# Patient Record
Sex: FEMALE | Race: OTHER | NOT HISPANIC OR LATINO | URBAN - METROPOLITAN AREA
[De-identification: names, ages, dates, MRNs, and addresses within clinical notes are randomized per-mention and may not be internally consistent; named-entity substitution may affect disease eponyms.]

---

## 2024-08-21 ENCOUNTER — HOSPITAL ENCOUNTER (EMERGENCY)
Facility: HOSPITAL | Age: 54
Discharge: LEFT BEFORE DISCHARGE | End: 2024-08-21
Attending: STUDENT IN AN ORGANIZED HEALTH CARE EDUCATION/TRAINING PROGRAM

## 2024-08-21 VITALS
OXYGEN SATURATION: 98 % | WEIGHT: 128.31 LBS | DIASTOLIC BLOOD PRESSURE: 74 MMHG | SYSTOLIC BLOOD PRESSURE: 142 MMHG | RESPIRATION RATE: 18 BRPM | TEMPERATURE: 98 F | HEART RATE: 87 BPM

## 2024-08-21 DIAGNOSIS — N89.8 VAGINAL ITCHING: Primary | ICD-10-CM

## 2024-08-21 PROCEDURE — 99282 EMERGENCY DEPT VISIT SF MDM: CPT

## 2024-08-21 RX ORDER — CLOPIDOGREL BISULFATE 75 MG/1
1 TABLET ORAL DAILY
COMMUNITY
Start: 2024-05-20

## 2024-08-21 RX ORDER — LISINOPRIL 20 MG/1
1 TABLET ORAL EVERY MORNING
COMMUNITY

## 2024-08-21 RX ORDER — ROSUVASTATIN CALCIUM 10 MG/1
1 TABLET, COATED ORAL DAILY
COMMUNITY
Start: 2024-05-20

## 2024-08-21 RX ORDER — METFORMIN HYDROCHLORIDE 1000 MG/1
1000 TABLET ORAL
COMMUNITY

## 2024-08-22 NOTE — ED PROVIDER NOTES
SCRIBE #1 NOTE: I, Sharda Madsen, am scribing for, and in the presence of, Nikolai Cross MD. I have scribed the entire note.       History     Chief Complaint   Patient presents with    Vaginal Itching     Pt c/o vaginal itching x 1 week     Review of patient's allergies indicates:  No Known Allergies      History of Present Illness     HPI    8/21/2024, 11:47 PM  History obtained from the patient      History of Present Illness: Darline Mott is a 54 y.o. female patient who presents to the emergency department due to vaginal itching.  Immediately upon the patient getting into the room, I was informed by nursing staff that she wanted to leave.  I went into the room to speak with her, she states that she is having some vaginal itching, but does not want to talk about it and she is currently in an argument with her daughter, so she just wants to leave.  She states she is in a bad mood and she no longer wants to stay.  She does not answer any further questions.    PCP: No primary care provider on file.        Past Medical History:  History reviewed. No pertinent past medical history.    Past Surgical History:  History reviewed. No pertinent surgical history.      Family History:  No family history on file.    Social History:  Social History     Tobacco Use    Smoking status: Never    Smokeless tobacco: Never   Substance and Sexual Activity    Alcohol use: Not on file    Drug use: Not on file    Sexual activity: Not on file        Review of Systems     Review of Systems   Reason unable to perform ROS: Patient uncooperative, does not want to answer any questions due to argument with daughter.   Genitourinary:         Positive vaginal itching        Physical Exam     Initial Vitals [08/21/24 2309]   BP Pulse Resp Temp SpO2   (!) 142/74 87 18 97.7 °F (36.5 °C) 98 %      MAP       --          Physical Exam  Nursing Notes and Vital Signs Reviewed.  Constitutional: Patient is in no acute distress. Well-developed and  well-nourished.  Head: Atraumatic. Normocephalic.  Eyes: PERRL. EOM intact. Conjunctivae are not pale.  ENT: Mucous membranes are moist.     On exam, patient no acute distress.  She did not let me complete a full exam.     ED Course   Procedures  ED Vital Signs:  Vitals:    08/21/24 2309   BP: (!) 142/74   Pulse: 87   Resp: 18   Temp: 97.7 °F (36.5 °C)   TempSrc: Oral   SpO2: 98%   Weight: 58.2 kg (128 lb 4.9 oz)       Abnormal Lab Results:  Labs Reviewed   VAGINAL SCREEN   URINALYSIS, REFLEX TO URINE CULTURE        Imaging Results:  Imaging Results    None             The Emergency Provider reviewed the vital signs and test results, which are outlined above.     ED Discussion       12:05 AM: Pt is A&O x3, appropriate, and of capacity at this time. Pt voices desire to leave hospital. D/w pt in length that she likely has an easily treatable condition, all it would requires a quick exam and vaginal swab.  However,  pt declines any further evaluation or tx at this time. All risks, including worsening sx, permanent bodily harm and death, were discussed in length. Pt acknowledges all risk at this time and would like to leave the emergency department.  Pt given RTER instructions. All questions and concerns addressed at this time. Pt leaving AMA.          Medical Decision Making              ED Medication(s):  Medications - No data to display    Discharge Medication List as of 8/21/2024 11:50 PM                  Scribe Attestation:   Scribe #1: I performed the above scribed service and the documentation accurately describes the services I performed. I attest to the accuracy of the note.     Attending:   Physician Attestation Statement for Scribe #1: I, Nikolai Cross MD, personally performed the services described in this documentation, as scribed by Sharda Madsen, in my presence, and it is both accurate and complete.           Clinical Impression       ICD-10-CM ICD-9-CM   1. Vaginal itching  N89.8 698.1               Nikolai Cross MD  08/22/24 0006

## 2024-08-22 NOTE — DISCHARGE INSTRUCTIONS
Please follow-up with your PCP. Please call on the next business day to schedule this appointment.    Return to the ER any time you would like to be repeat evaluated.

## 2025-04-14 ENCOUNTER — HOSPITAL ENCOUNTER (EMERGENCY)
Facility: HOSPITAL | Age: 55
Discharge: HOME | End: 2025-04-14
Attending: EMERGENCY MEDICINE
Payer: COMMERCIAL

## 2025-04-14 VITALS
HEIGHT: 61 IN | HEART RATE: 99 BPM | WEIGHT: 124 LBS | RESPIRATION RATE: 16 BRPM | OXYGEN SATURATION: 98 % | TEMPERATURE: 98 F | DIASTOLIC BLOOD PRESSURE: 70 MMHG | BODY MASS INDEX: 23.41 KG/M2 | SYSTOLIC BLOOD PRESSURE: 118 MMHG

## 2025-04-14 DIAGNOSIS — L40.9 PSORIASIS: Primary | ICD-10-CM

## 2025-04-14 PROCEDURE — 25000003 PHARM REV CODE 250

## 2025-04-14 PROCEDURE — 99284 EMERGENCY DEPT VISIT MOD MDM: CPT

## 2025-04-14 RX ORDER — CETIRIZINE HYDROCHLORIDE 10 MG/1
10 TABLET ORAL
Status: COMPLETED | OUTPATIENT
Start: 2025-04-14 | End: 2025-04-14

## 2025-04-14 RX ORDER — HYDROXYZINE HYDROCHLORIDE 25 MG/1
25 TABLET, FILM COATED ORAL EVERY 6 HOURS
Qty: 12 TABLET | Refills: 0 | Status: SHIPPED | OUTPATIENT
Start: 2025-04-14

## 2025-04-14 RX ORDER — TRIAMCINOLONE ACETONIDE 1 MG/G
OINTMENT TOPICAL 2 TIMES DAILY
Qty: 30 G | Refills: 2 | Status: SHIPPED | OUTPATIENT
Start: 2025-04-14

## 2025-04-14 RX ADMIN — CETIRIZINE HYDROCHLORIDE 10 MG: 10 TABLET, FILM COATED ORAL at 11:04

## 2025-04-15 NOTE — ED PROVIDER NOTES
Encounter Date: 4/14/2025       History     Chief Complaint   Patient presents with    Rash     Rash to R ankle. Flaking/itching. Started over 1 year but has gotten worse since onset.      55-year-old female presenting to the emergency department with complaints of respiratory or right ankle x1 year.  Patient states the rash has been progressively getting worse over the last year.  She is not being followed by Dermatology.  Her PCP as provided her with ointments over the last year; patient is unable to identify exactly which ointments she has tried.  Rash is red and scaly.  Patient is complaining of associated pruritus.  No known triggers.  There is no associated pain, drainage, or signs of infection.  No other lesions noted; specifically no lesions on the scalp or in the hair.  Patient denies changes in soaps, lotions, and detergents.  No new allergies or new medications.  Denies fever, chills, joint pain, joint swelling, chest pain, shortness of breath, and all other symptoms at this time.    The history is provided by the patient.     Review of patient's allergies indicates:  No Known Allergies  No past medical history on file.  No past surgical history on file.  No family history on file.  Social History[1]  Review of Systems   Constitutional:  Negative for fever.   HENT:  Negative for sore throat.    Respiratory:  Negative for shortness of breath.    Cardiovascular:  Negative for chest pain.   Gastrointestinal:  Negative for nausea.   Genitourinary:  Negative for dysuria.   Musculoskeletal:  Negative for back pain.   Skin:  Positive for rash.   Neurological:  Negative for weakness.   Hematological:  Does not bruise/bleed easily.   All other systems reviewed and are negative.      Physical Exam     Initial Vitals [04/14/25 2226]   BP Pulse Resp Temp SpO2   117/62 99 16 98.1 °F (36.7 °C) 95 %      MAP       --         Physical Exam    Nursing note reviewed.  Constitutional: She appears well-developed and  well-nourished.  Non-toxic appearance. She does not have a sickly appearance. She does not appear ill. No distress.   HENT:   Head: Normocephalic and atraumatic.   Right Ear: Hearing normal.   Left Ear: Hearing normal.   Nose: Nose normal. Mouth/Throat: Uvula is midline and oropharynx is clear and moist.   Eyes: Conjunctivae, EOM and lids are normal. Pupils are equal, round, and reactive to light.   Neck: Trachea normal. Neck supple.   Normal range of motion.   Full passive range of motion without pain.     Cardiovascular:  Normal rate, regular rhythm, normal heart sounds, intact distal pulses and normal pulses.           Pulmonary/Chest: Effort normal and breath sounds normal.   Abdominal: Abdomen is soft. Bowel sounds are normal. There is no abdominal tenderness. There is no rebound and no guarding.   Musculoskeletal:         General: Normal range of motion.      Cervical back: Normal, full passive range of motion without pain, normal range of motion and neck supple.     Neurological: She is alert and oriented to person, place, and time. She has normal strength and normal reflexes. No cranial nerve deficit or sensory deficit. GCS eye subscore is 4. GCS verbal subscore is 5. GCS motor subscore is 6.   Skin: Skin is warm and dry.   On the right lateral ankle, there is a well-demarcated, erythematous plaque with overlying silvery white scales.  Measuring approximately 10 cm x 10 cm.  No purulence, drainage, warmth, or fluctuance.  No vesicles or ulcerations noted.  Patient does not have any other lesions on the extensor surfaces.  No involvement of nail plates are of the scalp.   Psychiatric: She has a normal mood and affect. Her behavior is normal. Thought content normal.         ED Course   Procedures  Labs Reviewed - No data to display       Imaging Results    None          Medications   cetirizine tablet 10 mg (10 mg Oral Given 4/14/25 1234)     Medical Decision Making  Risk  OTC drugs.  Prescription drug  management.  Risk Details: 55-year-old female presenting to the emergency department with a rash on her right ankle x1 year.  Patient states the symptoms have been progressively worsening over the last year.  She has been on various topical corticosteroid for psoriasis; patient is unable to identify exactly which when she has been on.  States that she has been having increasing pruritus and worsening of her rash.  There does not appear to be any involvement of the fingernails, scalp, or any joints.  She has not followed by Dermatology; therefore, referral was placed today.  Patient was placed on triamcinolone ointment and provided with hydroxyzine for pruritus.  Patient is otherwise encouraged to follow up with her primary care provider in the next 1-2 days.    I discussed with patient and/or family/caretaker that evaluation in the ED does not suggest any emergent or life threatening medical conditions requiring immediate intervention beyond what was provided in the ED, and I believe patient is safe for discharge. Regardless, an unremarkable evaluation in the ED does not preclude the development or presence of a serious of life threatening condition. As such, patient was instructed to return immediately for any worsening or change in current symptoms.                                       Clinical Impression:  Final diagnoses:  [L40.9] Psoriasis (Primary)          ED Disposition Condition    Discharge Stable          ED Prescriptions       Medication Sig Dispense Start Date End Date Auth. Provider    triamcinolone acetonide 0.1% (KENALOG) 0.1 % ointment Apply topically 2 (two) times daily. 30 g 4/14/2025 -- Malaika John PA-C    hydrOXYzine HCL (ATARAX) 25 MG tablet Take 1 tablet (25 mg total) by mouth every 6 (six) hours. 12 tablet 4/14/2025 -- Malaika John PA-C          Follow-up Information       Follow up With Specialties Details Why Contact Info    [facility] - Emergency Dept. Emergency Medicine  If  symptoms worsen [id number]  [facility] [address] [phone]               [1]   Social History  Tobacco Use    Smoking status: Never    Smokeless tobacco: Never        Malaika John PA-C  04/15/25 0128